# Patient Record
Sex: FEMALE | Race: WHITE
[De-identification: names, ages, dates, MRNs, and addresses within clinical notes are randomized per-mention and may not be internally consistent; named-entity substitution may affect disease eponyms.]

---

## 2017-12-28 ENCOUNTER — HOSPITAL ENCOUNTER (EMERGENCY)
Dept: HOSPITAL 25 - ED | Age: 27
LOS: 1 days | Discharge: HOME | End: 2017-12-29
Payer: COMMERCIAL

## 2017-12-28 DIAGNOSIS — Z88.0: ICD-10-CM

## 2017-12-28 DIAGNOSIS — F41.9: Primary | ICD-10-CM

## 2017-12-28 DIAGNOSIS — B34.9: ICD-10-CM

## 2017-12-28 LAB
BASOPHILS # BLD AUTO: 0.1 10^3/UL (ref 0–0.2)
EOSINOPHIL # BLD AUTO: 0.1 10^3/UL (ref 0–0.6)
HCT VFR BLD AUTO: 40 % (ref 35–47)
HGB BLD-MCNC: 13.5 G/DL (ref 12–16)
LYMPHOCYTES # BLD AUTO: 1.6 10^3/UL (ref 1–4.8)
MCH RBC QN AUTO: 29 PG (ref 27–31)
MCHC RBC AUTO-ENTMCNC: 34 G/DL (ref 31–36)
MCV RBC AUTO: 86 FL (ref 80–97)
MONOCYTES # BLD AUTO: 0.7 10^3/UL (ref 0–0.8)
NEUTROPHILS # BLD AUTO: 6.7 10^3/UL (ref 1.5–7.7)
NRBC # BLD AUTO: 0 10^3/UL
NRBC BLD QL AUTO: 0
PLATELET # BLD AUTO: 212 10^3/UL (ref 150–450)
RBC # BLD AUTO: 4.67 10^6/UL (ref 4–5.4)
WBC # BLD AUTO: 9.1 10^3/UL (ref 3.5–10.8)

## 2017-12-28 PROCEDURE — 87040 BLOOD CULTURE FOR BACTERIA: CPT

## 2017-12-28 PROCEDURE — 96360 HYDRATION IV INFUSION INIT: CPT

## 2017-12-28 PROCEDURE — 99283 EMERGENCY DEPT VISIT LOW MDM: CPT

## 2017-12-28 PROCEDURE — 87086 URINE CULTURE/COLONY COUNT: CPT

## 2017-12-28 PROCEDURE — 85730 THROMBOPLASTIN TIME PARTIAL: CPT

## 2017-12-28 PROCEDURE — 81003 URINALYSIS AUTO W/O SCOPE: CPT

## 2017-12-28 PROCEDURE — 93005 ELECTROCARDIOGRAM TRACING: CPT

## 2017-12-28 PROCEDURE — 84484 ASSAY OF TROPONIN QUANT: CPT

## 2017-12-28 PROCEDURE — 36415 COLL VENOUS BLD VENIPUNCTURE: CPT

## 2017-12-28 PROCEDURE — 84443 ASSAY THYROID STIM HORMONE: CPT

## 2017-12-28 PROCEDURE — 71010: CPT

## 2017-12-28 PROCEDURE — 80053 COMPREHEN METABOLIC PANEL: CPT

## 2017-12-28 PROCEDURE — 85025 COMPLETE CBC W/AUTO DIFF WBC: CPT

## 2017-12-28 PROCEDURE — 86140 C-REACTIVE PROTEIN: CPT

## 2017-12-28 PROCEDURE — 87502 INFLUENZA DNA AMP PROBE: CPT

## 2017-12-28 PROCEDURE — 96374 THER/PROPH/DIAG INJ IV PUSH: CPT

## 2017-12-28 PROCEDURE — 81015 MICROSCOPIC EXAM OF URINE: CPT

## 2017-12-28 PROCEDURE — 83605 ASSAY OF LACTIC ACID: CPT

## 2017-12-29 VITALS — DIASTOLIC BLOOD PRESSURE: 64 MMHG | SYSTOLIC BLOOD PRESSURE: 103 MMHG

## 2017-12-29 NOTE — RAD
Indication: Fever. Chest pressure.



Comparison: No relevant prior exams available on the [facility] PACS for comparison.



Technique: Upright AP 2324 hours



Report: Accounting for superimposed dense breast tissue the lungs and pleural spaces are

clear. Negative for pneumothorax. The heart, pulmonary vasculature, and mediastinal

contours are unremarkable.



IMPRESSION:  No evidence for pneumonia. Negative exam.

## 2017-12-29 NOTE — ED
Sulaiman ASIF Tecjoon scribashwin for Hermilo Olivas MD on 12/28/17 at 2247 .





HPI Chest Pain





- HPI Summary


HPI Summary: 





This patient is a 27 year old female presenting to [facility] accompanied by male 

 with a chief complaint of chest pressure since an hour ago. Patient 

states that she was just watching TV when her heart starting racing. Patient 

states that she had previous episode a week ago with similar sx. Patient denies 

use of any cold medications but has had recent illness. Symptoms aggravated by 

nothing. Symptoms alleviated by medication. The patient treated the pain with 

ibuprofen PTA. Patient additionally reports chills, shaking, fever. Patient 

denies a history of anxiety or panic attack.





- History of Current Complaint


Chief Complaint: EDDysrhythmPalp


Time Seen by Provider: 12/28/17 22:26


Hx Obtained From: Patient


Onset/Duration: Started Hours Ago


Time of Onset: 21:30


Timing: Intermittent


Initial Severity: Moderate


Current Severity: None


Pain Intensity: 0


Pain Scale Used: 0-10 Numeric


Chest Pain Radiates: No


Aggravating Factor(s): Nothing


Alleviating Factor(s): OTC Meds


Associated Signs and Symptoms: Positive: Other: - chills, shaking, fever





- Allergy/Home Medications


Allergies/Adverse Reactions: 


 Allergies











Allergy/AdvReac Type Severity Reaction Status Date / Time


 


Amoxicillin Allergy  Rash Verified 12/28/17 22:19


 


Metronidazole [From Flagyl] Allergy  Shortness Verified 12/28/17 22:19





   of Breath  














PMH/Surg Hx/FS Hx/Imm Hx


Previously Healthy: Yes


Opthamlomology History: 


   Denies: Hx Legally Blind


EENT History: 


   Denies: Hx Deafness


Psychiatric History: 


   Denies: Hx Anxiety, Hx Panic Disorder


Infectious Disease History: No


Infectious Disease History: 


   Denies: Traveled Outside the [place] in Last 30 Days





- Family History


Known Family History: 


   Negative: Seizure Disorder





- Social History


Occupation: Employed Full-time


Lives: Alone


Hx Substance Use: No


Substance Use Type: Reports: None


Hx Tobacco Use: No





Review of Systems


Positive: Fever, Chills, Other - shaking


Positive: Palpitations, Chest Pain


All Other Systems Reviewed And Are Negative: Yes





Physical Exam





- Summary


Physical Exam Summary: 





VITAL SIGNS: Reviewed.


GENERAL:  Patient is a well-developed and nourished female who is lying in the 

stretcher. Patient is not in any acute respiratory distress. Patient is very 

anxious.


HEAD AND FACE: No signs of trauma. No ecchymosis, hematomas or skull 

depressions. No sinus tenderness.


EYES: PERRLA, EOMI x 2, No injected conjunctiva, no nystagmus.


EARS: Hearing grossly intact. Ear canals and tympanic membranes are within 

normal limits.


MOUTH: Oropharynx within normal limits.


NECK: Supple, trachea is midline, no adenopathy, no JVD, no carotid bruit, no c-

spine tenderness, neck with full ROM.


CHEST: Symmetric, no tenderness at palpation


LUNGS: Clear to auscultation bilaterally. No wheezing or crackles.


CVS: Regular Tachycardia. S1 and S2 present, no murmurs or gallops appreciated.


ABDOMEN: Soft, non-tender. No signs of distention. No rebound no guarding, and 

no masses palpated. Bowel sounds are normal.


EXTREMITIES: FROM in all major joints, no edema, no cyanosis or clubbing.


NEURO: Alert and oriented x 3. No acute neurological deficits. Speech is normal 

and follows commands.


SKIN: Dry and warm


Triage Information Reviewed: Yes


Vital Signs On Initial Exam: 


 Initial Vitals











Temp Pulse Resp BP Pulse Ox


 


 100.4 F   137   20   155/111   100 


 


 12/28/17 22:15  12/28/17 22:15  12/28/17 22:15  12/28/17 22:15  12/28/17 22:15











Vital Signs Reviewed: Yes





Diagnostics





- Vital Signs


 Vital Signs











  Temp Pulse Resp BP Pulse Ox


 


 12/28/17 22:15  100.4 F  137  20  155/111  100














- Laboratory


Lab Results: 


 Lab Results











  12/28/17 12/28/17 12/28/17 Range/Units





  23:00 23:00 23:00 


 


WBC    9.1  (3.5-10.8)  10^3/ul


 


RBC    4.67  (4.0-5.4)  10^6/ul


 


Hgb    13.5  (12.0-16.0)  g/dl


 


Hct    40  (35-47)  %


 


MCV    86  (80-97)  fL


 


MCH    29  (27-31)  pg


 


MCHC    34  (31-36)  g/dl


 


RDW    13  (10.5-15)  %


 


Plt Count    212  (150-450)  10^3/ul


 


MPV    8  (7.4-10.4)  um3


 


Neut % (Auto)    73.3  (38-83)  %


 


Lymph % (Auto)    17.5 L  (25-47)  %


 


Mono % (Auto)    7.9  (1-9)  %


 


Eos % (Auto)    0.7  (0-6)  %


 


Baso % (Auto)    0.6  (0-2)  %


 


Absolute Neuts (auto)    6.7  (1.5-7.7)  10^3/ul


 


Absolute Lymphs (auto)    1.6  (1.0-4.8)  10^3/ul


 


Absolute Monos (auto)    0.7  (0-0.8)  10^3/ul


 


Absolute Eos (auto)    0.1  (0-0.6)  10^3/ul


 


Absolute Basos (auto)    0.1  (0-0.2)  10^3/ul


 


Absolute Nucleated RBC    0  10^3/ul


 


Nucleated RBC %    0  


 


APTT   26.4   (26.0-36.3)  seconds


 


Sodium  137    (133-145)  mmol/L


 


Potassium  3.0 L    (3.5-5.0)  mmol/L


 


Chloride  101    (101-111)  mmol/L


 


Carbon Dioxide  26    (22-32)  mmol/L


 


Anion Gap  10    (2-11)  mmol/L


 


BUN  18    (6-24)  mg/dL


 


Creatinine  0.96 H    (0.51-0.95)  mg/dL


 


Est GFR ( Amer)  88.4    (>60)  


 


Est GFR (Non-Af Amer)  68.7    (>60)  


 


BUN/Creatinine Ratio  18.8    (8-20)  


 


Glucose  123 H    ()  mg/dL


 


Lactic Acid     (0.5-2.0)  mmol/L


 


Calcium  9.7    (8.6-10.3)  mg/dL


 


Total Bilirubin  0.50    (0.2-1.0)  mg/dL


 


AST  15    (13-39)  U/L


 


ALT  12    (7-52)  U/L


 


Alkaline Phosphatase  40    ()  U/L


 


Troponin I  0.00    (<0.04)  ng/mL


 


C-Reactive Protein  < 1.00    (< 5.00)  mg/L


 


Total Protein  7.1    (6.4-8.9)  g/dL


 


Albumin  4.3    (3.2-5.2)  g/dL


 


Globulin  2.8    (2-4)  g/dL


 


Albumin/Globulin Ratio  1.5    (1-3)  


 


TSH  1.22    (0.34-5.60)  mcIU/mL


 


Urine Color     


 


Urine Appearance     


 


Urine pH     (5-9)  


 


Ur Specific Gravity     (1.010-1.030)  


 


Urine Protein     (Negative)  


 


Urine Ketones     (Negative)  


 


Urine Blood     (Negative)  


 


Urine Nitrate     (Negative)  


 


Urine Bilirubin     (Negative)  


 


Urine Urobilinogen     (Negative)  


 


Ur Leukocyte Esterase     (Negative)  


 


Urine WBC (Auto)     (Absent)  


 


Urine RBC (Auto)     (Absent)  


 


Ur Squamous Epith Cells     (Absent)  


 


Urine Bacteria     (Absent)  


 


Urine Glucose     (Negative)  


 


Influenza A (Rapid)     (Negative)  


 


Influenza B (Rapid)     (Negative)  














  12/28/17 12/29/17 12/29/17 Range/Units





  23:00 00:25 00:32 


 


WBC     (3.5-10.8)  10^3/ul


 


RBC     (4.0-5.4)  10^6/ul


 


Hgb     (12.0-16.0)  g/dl


 


Hct     (35-47)  %


 


MCV     (80-97)  fL


 


MCH     (27-31)  pg


 


MCHC     (31-36)  g/dl


 


RDW     (10.5-15)  %


 


Plt Count     (150-450)  10^3/ul


 


MPV     (7.4-10.4)  um3


 


Neut % (Auto)     (38-83)  %


 


Lymph % (Auto)     (25-47)  %


 


Mono % (Auto)     (1-9)  %


 


Eos % (Auto)     (0-6)  %


 


Baso % (Auto)     (0-2)  %


 


Absolute Neuts (auto)     (1.5-7.7)  10^3/ul


 


Absolute Lymphs (auto)     (1.0-4.8)  10^3/ul


 


Absolute Monos (auto)     (0-0.8)  10^3/ul


 


Absolute Eos (auto)     (0-0.6)  10^3/ul


 


Absolute Basos (auto)     (0-0.2)  10^3/ul


 


Absolute Nucleated RBC     10^3/ul


 


Nucleated RBC %     


 


APTT     (26.0-36.3)  seconds


 


Sodium     (133-145)  mmol/L


 


Potassium     (3.5-5.0)  mmol/L


 


Chloride     (101-111)  mmol/L


 


Carbon Dioxide     (22-32)  mmol/L


 


Anion Gap     (2-11)  mmol/L


 


BUN     (6-24)  mg/dL


 


Creatinine     (0.51-0.95)  mg/dL


 


Est GFR ( Amer)     (>60)  


 


Est GFR (Non-Af Amer)     (>60)  


 


BUN/Creatinine Ratio     (8-20)  


 


Glucose     ()  mg/dL


 


Lactic Acid  1.4    (0.5-2.0)  mmol/L


 


Calcium     (8.6-10.3)  mg/dL


 


Total Bilirubin     (0.2-1.0)  mg/dL


 


AST     (13-39)  U/L


 


ALT     (7-52)  U/L


 


Alkaline Phosphatase     ()  U/L


 


Troponin I     (<0.04)  ng/mL


 


C-Reactive Protein     (< 5.00)  mg/L


 


Total Protein     (6.4-8.9)  g/dL


 


Albumin     (3.2-5.2)  g/dL


 


Globulin     (2-4)  g/dL


 


Albumin/Globulin Ratio     (1-3)  


 


TSH     (0.34-5.60)  mcIU/mL


 


Urine Color   Yellow   


 


Urine Appearance   Clear   


 


Urine pH   6.0   (5-9)  


 


Ur Specific Gravity   1.008 L   (1.010-1.030)  


 


Urine Protein   Negative   (Negative)  


 


Urine Ketones   Negative   (Negative)  


 


Urine Blood   Negative   (Negative)  


 


Urine Nitrate   Negative   (Negative)  


 


Urine Bilirubin   Negative   (Negative)  


 


Urine Urobilinogen   Negative   (Negative)  


 


Ur Leukocyte Esterase   Trace H   (Negative)  


 


Urine WBC (Auto)   Trace(0-5/hpf)   (Absent)  


 


Urine RBC (Auto)   Trace(0-2/hpf)   (Absent)  


 


Ur Squamous Epith Cells   Present H   (Absent)  


 


Urine Bacteria   Absent   (Absent)  


 


Urine Glucose   Negative   (Negative)  


 


Influenza A (Rapid)    Negative  (Negative)  


 


Influenza B (Rapid)    Negative  (Negative)  











Result Diagrams: 


 12/28/17 23:00





 12/28/17 23:00


Lab Statement: Any lab studies that have been ordered have been reviewed, and 

results considered in the medical decision making process.





- Radiology


  ** CXR


Xray Interpretation: No Acute Changes


Radiology Interpretation Completed By: ED Physician, Radiologist





- EKG


  ** 8213


Cardiac Rate: Tachycardia


EKG Rhythm: Sinus Tachycardia - 105 BPM


EKG Interpretation: Sinus Tachycardia, Normal axis. Normal interval. No 

ischemic changes





Chest Pain Course/Dx





- Course


Course Of Treatment: This patient is a 27 year old female presenting to [facility] 

accompanied by male  with a chief complaint of chest pressure since an 

hour ago. Patient states that she was just watching TV when her heart starting 

racing. Patient states that she had previous episode a week ago with similar 

sx. An EKG, taken 2222, reveals Sinus Tachycardia (105 BPM), Normal axis. 

Normal interval. No ischemic changes .CXR reveals, per radiologist, No acute 

process. ED physician has reviewed this radiology report. Bloodwork Obtained. 

Urinalysis Obtained. In the ED course the patient was given Ativan, Tylenol, 

Potassium Chloride.  I discussed patients imaging results. Patient is feeling 

better and her heart rate is <100. The patient will be discharged with a 

diagnosis for 1. Viral Syndrome and 2. Anxiety. Patient will be given a 

prescription for Xanax and is advised to follow up with PCP in 3 days.  The 

patient is agreeable with this plan.





- Diagnoses


Provider Diagnoses: 


 Anxiety, Viral syndrome








Discharge





- Discharge Plan


Condition: Stable


Disposition: HOME


Prescriptions: 


ALPRAZolam TAB* [Xanax TAB*] 0.5 mg PO BID PRN #14 tab MDD 2


 PRN Reason: Anxiety


ALPRAZolam TAB* [Xanax TAB*] 0.5 mg PO BID PRN #14 tab MDD 2


 PRN Reason: Anxiety


Patient Education Materials:  Viral Syndrome (ED), Anxiety (ED)


Referrals: 


No Primary Care Phys,NOPCP [Primary Care Provider] - 3 Days


Additional Instructions: 


The patient will be discharged with a diagnosis for 1. Viral Syndrome and 2. 

Anxiety. Patient will be given a prescription for Xanax and is advised to 

follow up with PCP in 3 days. 


The patient is agreeable with this plan.





Take Motrin as needed for Viral syndrome. Take Xanax as directed.





RETURN TO EMERGENCY DEPARTMENT FOR ANY NEW OR WORSENING SYMPTOMS





The documentation as recorded by the Sulaiman valdez Tecjoon accurately reflects 

the service I personally performed and the decisions made by me, Hermilo Olivas MD.

## 2018-01-26 ENCOUNTER — HOSPITAL ENCOUNTER (EMERGENCY)
Dept: HOSPITAL 25 - UCEAST | Age: 28
Discharge: HOME | End: 2018-01-26
Payer: COMMERCIAL

## 2018-01-26 VITALS — DIASTOLIC BLOOD PRESSURE: 88 MMHG | SYSTOLIC BLOOD PRESSURE: 137 MMHG

## 2018-01-26 DIAGNOSIS — K21.9: ICD-10-CM

## 2018-01-26 DIAGNOSIS — Z87.891: ICD-10-CM

## 2018-01-26 DIAGNOSIS — Z88.1: ICD-10-CM

## 2018-01-26 DIAGNOSIS — M94.0: Primary | ICD-10-CM

## 2018-01-26 PROCEDURE — 93005 ELECTROCARDIOGRAM TRACING: CPT

## 2018-01-26 PROCEDURE — 99212 OFFICE O/P EST SF 10 MIN: CPT

## 2018-01-26 PROCEDURE — G0463 HOSPITAL OUTPT CLINIC VISIT: HCPCS

## 2018-01-26 NOTE — UC
Cardiac HPI





- HPI Summary


HPI Summary: 





28 y/o female presents to the urgent care c/o chest pressure w/ movement  since 

1/22/208 after she visited the iropractor. She had a similar complaint about 

1 month ago and she went to the [facility] ER. She had a full work up and then she was 

discharged with ibuprofen and a follow up with cardiologist. Cardiologist did a 

holter monitor and everything was within normal limits. Today pain is 2/10 on 

the left lateral side of the ribs without any radiation. Pt denies dizziness, 

SOB, headache, abd pain, N/V/D. 





- History of Current Complaint


Chief Complaint: UCGeneralIllness


Stated Complaint: RIB PAIN


Hx Obtained From: Patient


Hx Last Menstrual Period: 1/26/18


Onset/Duration: Gradual Onset, Lasting Weeks - 4 weeks, Worse Since - January 22


Timing: Intermittent Episodes Lasting:


Initial Severity: Moderate


Current Severity: Moderate


Pain Intensity: 5


Chest Pain Location: Left Lateral


Character: Dull/Aching, Pressure/Squeezing


Aggravating Factor(s): Exertion


Alleviating Factor(s): Rest, OTC Meds


Associated Signs & Symptoms: Positive: Chest Pain.  Negative: Headaches, 

Dizziness, SOB, Nausea/Vomiting, Abdominal Pain


Related History: Similar Episode/Dx as - costochondritis





- Allergy/Home Medications


Allergies/Adverse Reactions: 


 Allergies











Allergy/AdvReac Type Severity Reaction Status Date / Time


 


Amoxicillin Allergy  Rash Verified 01/26/18 19:43


 


Metronidazole [From Flagyl] Allergy  Shortness Verified 01/26/18 19:43





   of Breath  














PMH/Surg Hx/FS Hx/Imm Hx


Previously Healthy: Yes - Pt denies PMHx





- Surgical History


Surgical History: None





- Family History


Known Family History: Positive: Hypertension, Diabetes


   Negative: Seizure Disorder





- Social History


Occupation: Employed Full-time


Lives: With Family


Alcohol Use: Occasionally


Substance Use Type: None


Smoking Status (MU): Former Smoker





Review of Systems


Constitutional: Negative


Skin: Negative


Eyes: Negative


ENT: Negative


Respiratory: Negative


Cardiovascular: Chest Pain


Gastrointestinal: Negative


Genitourinary: Negative


Motor: Negative


Neurovascular: Negative


Musculoskeletal: Negative


Neurological: Negative


Psychological: Negative


Is Patient Immunocompromised?: No


All Other Systems Reviewed And Are Negative: Yes





Physical Exam


Triage Information Reviewed: Yes


Vital Signs: 


 Initial Vital Signs











Temp  100 F   01/26/18 19:44


 


Pulse  83   01/26/18 19:44


 


Resp  16   01/26/18 19:44


 


BP  137/88   01/26/18 19:44


 


Pulse Ox  98   01/26/18 19:44











Vital Signs Reviewed: Yes





- Additional Comments





Vital signs: reviewed


General: awake and alert, in no obvious distress.


Skin: Pink, warm and dry, no diaphoresis, cyanosis or pallor.


HEENT:


-Head: normocephalic


-Eyes: PERRLA, sclera and conjunctiva clear.


-Ears:canals and TMs normal.


-Nose: patent


-Mouth/Throat: MMM, posterior pharynx clear.


Neck: supple. FROM, No JVD, trachea in midline, no bruits.


Chest: no orthopnea or dyspnea noted; no retractions or accessory muscle use, NT

, no crepitus, CTA bilaterally, no wheezes, rhonchi, rales. Point tenderness on 

the left lateral side of the ribs and left side of the sternum. No swelling 

observed or erythema.


Heart: RRR, no murmur, rub, or gallop.


Abd: soft, NT, BSA, no epigastric tenderness, mass, pulsation; femoral pulses.


Back: NT, no CVAT


Extrems: no swelling, edema, tenderness.


Neuro: A&O x 4, GCS 15, CNII  XII intact, no LOC and no focal neuro deficits.








- Assessment/Plan


Course Of Treatment: 28 y/o female presents to the urgent care c/o chest 

pressure w/ movement  since 1/22/208 after she visited the [facility]. She 

had a similar complaint about 1 month ago and she went to the [facility] ER. She had a 

full work up and then she was discharged with ibuprofen and a follow up with 

cardiologist. Cardiologist did a holter monitor and everything was within 

normal limits. Today pain is 2/10 on the left lateral side of the ribs without 

any radiation. Pt denies dizziness, SOB, headache, abd pain, N/V/D.  Hx 

obtained. EKG ordered. EKG shows NSR, heart rate of 80 bpm, normal axis, No ST 

elevations or depressions. Dr. Bradford consulted on pt's symptoms. She reviewed 

EKG and compared with previous one and agreed that normal EKG. Pt with 

costochondritis. Pt prescribed Tylenol and Omeprazole PO to alleviate symptoms.

  Advised to flu with her cardiologist if not improvement of symptoms. However 

if she developed SOB and severe chest pain to go immediately to the ER for 

further management.  Pt explained D/C instructions. Pt understood and agreed 

with plan of care. Left the clinic A&OX3 and hemodynamically stable.  





- Differential Diagnoses - Chest Pain


Differential Diagnosis/HQI/PQRI: Acute MI, Angina, GI Disease, Pulmonary 

Embolism





- Differential Diagnoses - Palpitations


Differential Diagnosis/HQI/PQRI: Cardiomyopathy, Mitral Valve Prolapse, 

Pericarditis, Pulmonary Embolism





- Clinical Impression


Provider Diagnoses: 1 - Costochondritis.  2 - GERD





Discharge





- Discharge Plan


Condition: Stable


Disposition: HOME


Prescriptions: 


Acetaminophen TAB* [Tylenol TAB*] 650 mg PO Q6H PRN #30 tab


 PRN Reason: Pain


Omeprazole CAP* [Prilosec CAP* 20 MG] 20 mg PO DAILY #30 cap.


Patient Education Materials:  Costochondritis (ED), Gastroesophageal Reflux 

Disease (ED)


Referrals: 


Cari Byers [Primary Care Provider] - 3 Days


Additional Instructions: 


1-Please take Tylenol  PO q6-8hrs prn as instructed after meals to alleviate 

pain and swelling. Avoid strenuous exercise


2- Take Omeprazole PO as directed for GERD. Avoid spicy food, tomato sauce, 

chocolates etc. 


3-Please  f/u with your PCP  in 2-3 days for further evaluation and treatment.


4-If you develop SOB, palpitation, severe chest pain, please go immediately to 

the Er for further treatment

## 2018-05-14 ENCOUNTER — HOSPITAL ENCOUNTER (EMERGENCY)
Dept: HOSPITAL 25 - UCEAST | Age: 28
Discharge: HOME | End: 2018-05-14
Payer: COMMERCIAL

## 2018-05-14 VITALS — SYSTOLIC BLOOD PRESSURE: 123 MMHG | DIASTOLIC BLOOD PRESSURE: 86 MMHG

## 2018-05-14 DIAGNOSIS — Z87.891: ICD-10-CM

## 2018-05-14 DIAGNOSIS — Z88.0: ICD-10-CM

## 2018-05-14 DIAGNOSIS — M54.2: ICD-10-CM

## 2018-05-14 DIAGNOSIS — R11.0: ICD-10-CM

## 2018-05-14 DIAGNOSIS — G43.809: Primary | ICD-10-CM

## 2018-05-14 DIAGNOSIS — Z88.1: ICD-10-CM

## 2018-05-14 DIAGNOSIS — Z32.02: ICD-10-CM

## 2018-05-14 PROCEDURE — 96375 TX/PRO/DX INJ NEW DRUG ADDON: CPT

## 2018-05-14 PROCEDURE — 99212 OFFICE O/P EST SF 10 MIN: CPT

## 2018-05-14 PROCEDURE — 84702 CHORIONIC GONADOTROPIN TEST: CPT

## 2018-05-14 PROCEDURE — 96374 THER/PROPH/DIAG INJ IV PUSH: CPT

## 2018-05-14 PROCEDURE — G0463 HOSPITAL OUTPT CLINIC VISIT: HCPCS

## 2018-05-14 PROCEDURE — 70450 CT HEAD/BRAIN W/O DYE: CPT

## 2018-05-14 PROCEDURE — 96360 HYDRATION IV INFUSION INIT: CPT

## 2018-05-14 NOTE — UC
Raghu ASIF Natalie, scribed for Isaak Isaacs MD on 05/14/18 at 1838 .





Headache HPI





- HPI Summary


HPI Summary: 


The patient is a 29 y/o F presenting to [facility] c/o headache in her left temporal 

and occipital area that started three weeks ago. The intermittent pain is rated 

6/10 in severity. She has taken Ibuprofen and Tylenol today (which usually help)

, accompanied by a walk, to little relief. She states that she has a lot of 

pressure to her head after taking the walk. Now she has a burning pain to the 

back of her head, and states that this is the worst headache of her life, so 

she came to . She additionally c/o burning in her nostrils and nausea without 

vomiting. She denies light sensitivity and visual changes.





- History Of Current Complaint


Chief Complaint: UCHeadache


Stated Complaint: HEADACHE


Time Seen by Provider: 05/14/18 18:17


Hx Obtained From: Patient


Hx Last Menstrual Period: 1/26/18


Onset/Duration: Sudden Onset, Still Present


Onset Of Symptoms: Gradual, Still Present


Initially Headache Was: "Worst Headache Ever", Moderate


Currently Pain Is: Current Pain Scale(0-10)= - 6


Pain Intensity: 6


Pain Scale Used: 0-10 Numeric


Timing: Intermittent, Lasting:


Character: Pressure


Location of Headache: Other: - left


Aggravating Factor(s): Nothing


Allevating Factor(s): Medication - ibuprofen, tylenol


Associated Signs And Symptoms: Positive: Nausea, Neck Pain.  Negative: Vomiting





- Allergies/Home Medications


Allergies/Adverse Reactions: 


 Allergies











Allergy/AdvReac Type Severity Reaction Status Date / Time


 


amoxicillin Allergy  Rash Verified 05/14/18 18:13


 


metronidazole [From Flagyl] Allergy  Shortness Verified 05/14/18 18:13





   of Breath  











Home Medications: 


 Home Medications





Acetaminophen TAB* [Tylenol TAB*] 1,000 mg PO Q6H PRN 05/14/18 [History 

Confirmed 05/14/18]


Ibuprofen 600 mg PO 05/14/18 [History]











PMH/Surg Hx/FS Hx/Imm Hx


Other Endocrine History: NEGATIVE: diabetes


Other Cardiovascular History: NEGATIVE: cardiac disease





- Surgical History


Surgical History: Yes


Surgery Procedure, Year, and Place: [facility]





- Family History


Known Family History: Positive: Hypertension, Diabetes


   Negative: Seizure Disorder





- Social History


Alcohol Use: Rare


Substance Use Type: None


Smoking Status (MU): Former Smoker





Review of Systems


Eyes: Other - NEGATIVE: vision changes, light sensitivity


ENT: Other - POSITIVE: burning in nostrils; NEGATIVE: neck pain


Gastrointestinal: Nausea, Other - NEGATIVE: vomiting


Neurological: Headache - left temporal/occipital


All Other Systems Reviewed And Are Negative: Yes





Physical Exam





- Summary


Physical Exam Summary: 


VITAL SIGNS: Reviewed.


GENERAL: Patient is a well-developed and nourished young female who is lying 

comfortable in the stretcher. Patient is not in any acute respiratory distress. 

She is in some distress connected to pain, but she is not ill-looking.


HEAD AND FACE: Normocephalic


EYES: PERRLA, EOMI x 2.


EARS: Hearing grossly intact.


MOUTH: Oropharynx within normal limits.


NECK: Supple, trachea is midline, no adenopathy, no JVD, no carotid bruit. No 

neck pain, Meningeal signs. Afebrile.


CHEST: Symmetric, no tenderness at palpation


LUNGS: Clear to auscultation bilaterally. No wheezing or crackles.


CVS: Regular rate and rhythm, S1 and S2 present, no murmurs or gallops 

appreciated.


ABDOMEN: Soft, non-tender. Bowel sounds are normal. No abdominal abnormal 

pulsations.


EXTREMITIES: Full ROM in all major joints, no edema, no cyanosis or clubbing.


NEURO: Alert and oriented x 3. No acute neurological deficits. Speech is normal 

and follows commands.


SKIN: Dry and warm


Triage Information Reviewed: Yes


Vital Signs: 


 Initial Vital Signs











Temp  99.0 F   05/14/18 18:08


 


Pulse  90   05/14/18 18:08


 


Resp  18   05/14/18 18:08


 


BP  123/86   05/14/18 18:08


 


Pulse Ox  100   05/14/18 18:08











Vital Signs Reviewed: Yes





Diagnostics





- Radiology


  ** Brain CT


Xray Interpretation: No Acute Changes - Negative examination. [facility] physician 

has reviewed this report.


Radiology Interpretation Completed By: Radiologist





Re-Evaluation





- Re-Evaluation


  ** First Eval


Re-Evaluation Time: 19:34


Change: Improved


Comment: The patient feels better after IV fluid and medication intake.





Headache Course/Dx





- Course


Course Of Treatment: The patient is a 29 y/o F presenting to [facility] c/o headache 

in her left temporal and occipital area that started three weeks ago. The 

intermittent pain is rated 6/10 in severity. She has taken Ibuprofen and 

Tylenol PTA, accompanied by a walk, to little relief. She states that she has a 

lot of pressure to her head after taking the walk. Now she has a burning pain 

to the back of her head, and states that this is the worst headache of her life

, so she came to . She additionally c/o burning in her nostrils and nausea 

without vomiting. She denies light sensitivity and visual changes.  The patient 

was found to have increased BP in UC. The patient will follow up with PCP for 

better control of BP.  Pregnancy test is negative, so patient will get Brain 

CT. Brain CT is negative.  In the UC, the patient was administered IV fluids, 

IV Toradol, and IV Reglan. After IV medications, the patient feels better. I 

believe the patient has a complex minor headache. She will be discharged home 

with prescription for Ibuprofen and Reglan for managing nausea. She is advised 

to return to UC if symptoms worsen.  I discussed all the findings and test 

results with the patient. Patient was instructed to return to the urgent care 

or go to ER immediately if any of the symptoms return or worsens. Plan of care 

was discussed with the patient, and patient understands and agrees. All 

questions were answered to patient satisfaction. There were no further 

complaints or concerns.





- Differential Dx/Diagnosis


Provider Diagnoses: Complex headache





Discharge





- Sign-Out/Discharge


Documenting (check all that apply): Discharge/Admit/Transfer





- Discharge Plan


Condition: Stable


Disposition: HOME


Prescriptions: 


Ibuprofen TAB* [Motrin TAB* 600 MG] 600 mg PO Q8H PRN #30 tab


 PRN Reason: Pain


Metoclopramide TAB* [Reglan TAB*] 10 mg PO Q8H #10 tab


Patient Education Materials:  Acute Headache (ED)


Referrals: 


Cari Byers [Primary Care Provider] - 


Additional Instructions: 


FOLLOW UP WITH YOUR PRIMARY CARE PROVIDER WITHIN ONE WEEK FOR HIGH BLOOD 

PRESSURE NOTED TODAY.


RETURN TO URGENT CARE OR THE ED FOR ANY WORSENING OR NEW SYMPTOMS.





- Billing Disposition and Condition


Condition: STABLE


Disposition: HOME





The documentation as recorded by the Raghu valdez Natalie accurately reflects 

the service I personally performed and the decisions made by me, Isaak Isaacs MD.

## 2018-05-14 NOTE — RAD
INDICATION: Headache     



COMPARISON: None



TECHNIQUE: Noncontrast axial source images were acquired from the skull base to the

vertex.



FINDINGS:



Ventricles/sulci: The ventricles and cisterns are normal in size and configuration for

age.



Brain parenchyma: There is no focal parenchymal finding, evidence of intracranial mass, 

or intracranial mass effect.



Intracranial hemorrhage:None.



Extra-axial spaces: There are no abnormal extra axial fluid collections or evidence of

extra-axial mass.



Calvarium: There is no calvarial fracture or other calvarial abnormality.



Scalp: There is no evidence of scalp or extracalvarial soft tissue abnormality.



Paranasal sinuses/mastoid: The paranasal sinuses and mastoid air cells are clear.



Other: None.



IMPRESSION: NEGATIVE EXAMINATION